# Patient Record
Sex: MALE | Race: WHITE | NOT HISPANIC OR LATINO | Employment: OTHER | URBAN - METROPOLITAN AREA
[De-identification: names, ages, dates, MRNs, and addresses within clinical notes are randomized per-mention and may not be internally consistent; named-entity substitution may affect disease eponyms.]

---

## 2018-06-05 ENCOUNTER — HOSPITAL ENCOUNTER (OUTPATIENT)
Dept: RADIOLOGY | Facility: MEDICAL CENTER | Age: 53
End: 2018-06-05

## 2018-06-11 ENCOUNTER — TELEMEDICINE2 (OUTPATIENT)
Dept: PHYSICAL MEDICINE AND REHAB | Facility: MEDICAL CENTER | Age: 53
End: 2018-06-11
Payer: COMMERCIAL

## 2018-06-11 ENCOUNTER — DOCUMENTATION (OUTPATIENT)
Dept: PHYSICAL MEDICINE AND REHAB | Facility: MEDICAL CENTER | Age: 53
End: 2018-06-11

## 2018-06-11 VITALS
SYSTOLIC BLOOD PRESSURE: 152 MMHG | DIASTOLIC BLOOD PRESSURE: 90 MMHG | WEIGHT: 152 LBS | BODY MASS INDEX: 22.51 KG/M2 | TEMPERATURE: 98.3 F | RESPIRATION RATE: 20 BRPM | OXYGEN SATURATION: 98 % | HEART RATE: 87 BPM | HEIGHT: 69 IN

## 2018-06-11 DIAGNOSIS — F43.10 PTSD (POST-TRAUMATIC STRESS DISORDER): ICD-10-CM

## 2018-06-11 DIAGNOSIS — Z72.0 TOBACCO ABUSE: ICD-10-CM

## 2018-06-11 DIAGNOSIS — M54.16 LUMBAR RADICULOPATHY: ICD-10-CM

## 2018-06-11 DIAGNOSIS — G89.29 CHRONIC BILATERAL LOW BACK PAIN WITH BILATERAL SCIATICA: ICD-10-CM

## 2018-06-11 DIAGNOSIS — M54.42 CHRONIC BILATERAL LOW BACK PAIN WITH BILATERAL SCIATICA: ICD-10-CM

## 2018-06-11 DIAGNOSIS — R29.898 WEAKNESS OF LEFT LOWER EXTREMITY: ICD-10-CM

## 2018-06-11 DIAGNOSIS — M54.41 CHRONIC BILATERAL LOW BACK PAIN WITH BILATERAL SCIATICA: ICD-10-CM

## 2018-06-11 DIAGNOSIS — M21.372 LEFT FOOT DROP: ICD-10-CM

## 2018-06-11 PROCEDURE — 99406 BEHAV CHNG SMOKING 3-10 MIN: CPT | Performed by: PHYSICAL MEDICINE & REHABILITATION

## 2018-06-11 PROCEDURE — 99205 OFFICE O/P NEW HI 60 MIN: CPT | Mod: 25 | Performed by: PHYSICAL MEDICINE & REHABILITATION

## 2018-06-11 RX ORDER — METOPROLOL SUCCINATE 50 MG/1
50 TABLET, EXTENDED RELEASE ORAL DAILY
COMMUNITY

## 2018-06-11 RX ORDER — QUETIAPINE FUMARATE 25 MG/1
25 TABLET, FILM COATED ORAL
COMMUNITY

## 2018-06-11 RX ORDER — HYDROCODONE BITARTRATE AND ACETAMINOPHEN 10; 325 MG/1; MG/1
1-2 TABLET ORAL EVERY 6 HOURS PRN
COMMUNITY

## 2018-06-11 RX ORDER — TRAZODONE HYDROCHLORIDE 100 MG/1
200 TABLET ORAL NIGHTLY
COMMUNITY

## 2018-06-11 RX ORDER — FOLIC ACID 1 MG/1
1 TABLET ORAL DAILY
COMMUNITY

## 2018-06-11 RX ORDER — BACLOFEN 10 MG/1
10 TABLET ORAL 3 TIMES DAILY
COMMUNITY

## 2018-06-11 RX ORDER — PRAZOSIN HYDROCHLORIDE 2 MG/1
2 CAPSULE ORAL NIGHTLY
COMMUNITY

## 2018-06-11 ASSESSMENT — PAIN SCALES - GENERAL: PAINLEVEL: 7=MODERATE-SEVERE PAIN

## 2018-06-11 NOTE — PROGRESS NOTES
Telemedicine New patient note    Physiatry (physical medicine and  Rehabilitation), interventional spine and sports medicine, Pain medicine    Date of Service: 6/11/2018    Chief complaint: low back pain    HISTORY    HPI: Maverick Henderson Jr. 52 y.o. male who presents today with chronic bilateral low back pain radiating down bilateral legs left worse than right with chronic weakness of the left lower extremity with foot drop and decreased strength with heel raises on the left when compared to the right.  The patient is also having significant amounts of falls secondary to weakness in the left lower extremity especially when he is trying to be more active.  Pain is worse with sitting typically, moderate to severe in intensity, aching in quality, shooting in quality, constant.  The patient has significant difficulty with his ADLs including lower body dressing with putting on his socks and shoes.  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  The patient has had a total of 6 lumbar spine surgeries and has had no significant improvement.  He has tried medial branch blocks and radiofrequency neurotomies with no significant improvement.  Pain has been worsening over the past year or so.       Medical records review:  I reviewed the note from the referring provider Jyothi Perez P.A. including the note dated 1/4/2018.  Essential hypertension, monitoring.  Chronic low back pain Norco refilled, referred to physiatry.    Previous treatments:    Physical Therapy: Yes    Medications the patient is tried: NSAIDs, Narcotics, gabapentin, tylenol and muscle relaxers    Previous interventions: Radiofrequency neurotomies were not helpful to the patient in 2011.    Previous surgeries to relieve the above pain: Yes, 6 lumbar spine surgeries including L4-5 fusion.    I reviewed the  which shows no overlapping prescriptions and the patient has had his  narcotic dose decreased from 180 tabs of Norco 10-3 25 per month and slowly titrated down now to 165 times per month.    ROS:   Red Flags ROS:   Fever, Chills, Sweats: Denies  Involuntary Weight Loss: Denies  Bladder Incontinence: Denies  Bowel Incontinence: denies  Saddle Anesthesia: Denies    All other systems reviewed and negative.       PMHx:   Past Medical History:   Diagnosis Date   • Other specified disorder of intestines     history of colitis   • Pain     lower back   • Pneumonia >2 yrs   • Psychiatric problem     anxiety and depression       PSHx:   Past Surgical History:   Procedure Laterality Date   • LUMBAR FUSION POSTERIOR  4/24/2013    Performed by Bj Ruvalcaba M.D. at SURGERY [facility]   • LUMBAR DECOMPRESSION  4/24/2013    Performed by Bj Ruvalcaba M.D. at SURGERY [facility]   • LUMBAR LAMINECTOMY DISKECTOMY  8/1/2011    Performed by BJ RUVALCABA at SURGERY [facility]   • LUMBAR DECOMPRESSION  8/1/2011    Performed by BJ RUVALCABA at SURGERY [facility]   • OTHER      1986  tonsillectomy   • OTHER NEUROLOGICAL SURG      1998 lower back  2001 lower back       Family history   History reviewed. No pertinent family history.      Medications:   Current Outpatient Prescriptions   Medication   • prazosin (MINIPRESS) 2 MG Cap   • traZODone (DESYREL) 100 MG Tab   • folic acid (FOLVITE) 1 MG Tab   • QUEtiapine (SEROQUEL) 25 MG Tab   • HYDROcodone/acetaminophen (NORCO)  MG Tab   • baclofen (LIORESAL) 10 MG Tab   • metoprolol SR (TOPROL XL) 50 MG TABLET SR 24 HR   • olanzapine (ZYPREXA) 2.5 MG TABS   • oxycodone-acetaminophen (PERCOCET) 5-325 MG TABS   • methocarbamol (ROBAXIN) 750 MG TABS   • zolpidem (AMBIEN) 5 MG TABS   • senna-docusate (PERICOLACE OR SENOKOT S) 8.6-50 MG TABS   • oxycodone-acetaminophen (PERCOCET) 5-325 MG TABS   • methocarbamol (ROBAXIN) 750 MG TABS   • zolpidem (AMBIEN) 5 MG TABS   • NON SPECIFIED   • NON SPECIFIED   • chlordiazepoxide (LIBRIUM) 5 MG  "CAPS   • Non Formulary Request     No current facility-administered medications for this visit.        Allergies:   No Known Allergies    Social Hx:   Social History     Social History   • Marital status:      Spouse name: N/A   • Number of children: N/A   • Years of education: N/A     Occupational History   • Not on file.     Social History Main Topics   • Smoking status: Never Smoker   • Smokeless tobacco: Current User     Types: Chew   • Alcohol use Yes      Comment: 24-36 beer per week   • Drug use: No   • Sexual activity: Not on file     Other Topics Concern   •  Service Yes   • Blood Transfusions Yes   • Caffeine Concern No   • Occupational Exposure No   • Hobby Hazards Yes   • Sleep Concern Yes   • Stress Concern Yes   • Weight Concern No   • Special Diet No   • Back Care No   • Exercise Yes   • Bike Helmet No   • Seat Belt Yes   • Self-Exams Yes     Social History Narrative   • No narrative on file         EXAMINATION     Physical Exam:   Vitals: Blood pressure 152/90, pulse 87, temperature 36.8 °C (98.3 °F), resp. rate 20, height 1.74 m (5' 8.5\"), weight 68.9 kg (152 lb), SpO2 98 %.    Constitutional:   Body Habitus: Body mass index is 22.78 kg/m².  Cooperation: Fully cooperates with exam  Appearance: Well-groomed, well-nourished, not disheveled     Eyes: No scleral icterus to suggest severe liver disease, no proptosis to suggest severe hyperthyroid    ENT -no obvious auditory deficits, no obvious tongue lesions, tongue midline, no facial droop     Skin -no rashes or lesions noted     Respiratory-  breathing comfortable on room air, no audible wheezing    Cardiovascular- capillary refills less than 2 seconds. No lower extremity edema is noted.     Gastrointestinal - no obvious abdominal masses, No tenderness to palpation in the abdomen    Psychiatric- alert and oriented ×3. Normal affect.     Gait - normal gait, no use of ambulatory device, nonantalgic.  The patient cannot heel walk and toe " walk on the left because of weakness.    Musculoskeletal -     Thoracic/Lumbar Spine/Sacral Spine/Hips   Inspection: Atrophy of the left lower extremity when compared to the right and both anterior and posterior compartments.  No other evidence of atrophy in bilateral lower extremities throughout however this is limited given his a telemedicine visit and limited by audiovideo quality.    ROM: decreased AROM with flexion, extension, lateral flexion, and rotation bilaterally, with pain     Lumbar spine Special tests  Neuro tension  Straight leg test positive bilaterally    Slump test positive bilaterally      Neuro       Motor Exam Lower Extremities. Exam is severely limited because of this being a telemedicine visit..  3*    ? Myotome R L   Hip flexion L2 3* 3*   Knee extension L3 3* 3*   Ankle dorsiflexion L4 3* 3*   Toe extension L5 3* 3*   Ankle plantarflexion S1 3* 3*       Telephone on the modified Benny scale could not be evaluated because this is a telemedicine visit.    Upper motor neuron signs including Troy sign, Babinski's guidance, clonus in the ankle cannot be evaluated because his a telemedicine visit.    Reflexes could not be evaluated because this is a telemedicine visit.      MEDICAL DECISION MAKING    Medical records review: see under HPI section.     DATA    Labs:   Lab Results   Component Value Date/Time    SODIUM 137 04/25/2013 03:30 AM    POTASSIUM 4.0 04/25/2013 03:30 AM    CHLORIDE 105 04/25/2013 03:30 AM    CO2 26 04/25/2013 03:30 AM    GLUCOSE 92 04/25/2013 03:30 AM    BUN 9 04/25/2013 03:30 AM    CREATININE 0.81 04/25/2013 03:30 AM        No results found for: PROTHROMBTM, INR     Lab Results   Component Value Date/Time    WBC 7.8 04/25/2013 03:30 AM    RBC 3.35 (L) 04/25/2013 03:30 AM    HEMOGLOBIN 12.2 (L) 04/25/2013 03:30 AM    HEMATOCRIT 36.4 (L) 04/25/2013 03:30 AM    .5 (H) 04/25/2013 03:30 AM    MCH 36.5 (H) 04/25/2013 03:30 AM    MCHC 33.6 04/25/2013 03:30 AM    MPV 7.0  04/25/2013 03:30 AM          Imaging: I personally reviewed following images, these are my reads  MRI lumbar spine 5/25/2018  L4-5 fusion.  Small disc herniation L5-S1 seen on the sagittal view series 3 image 7 possibly impinging on the bilateral L5 nerve roots.  Minimal L2-3 spinal stenosis.  Atrophy of the paraspinal muscles consistent with fusion.    IMAGING radiology reads. I reviewed the following radiology reads                                          MRI lumbar spine              Diagnosis   Visit Diagnoses     ICD-10-CM   1. Chronic bilateral low back pain with bilateral sciatica M54.42    M54.41    G89.29   2. Lumbar radiculopathy M54.16   3. Left foot drop M21.372   4. Weakness of left lower extremity R29.898   5. PTSD (post-traumatic stress disorder) F43.10   6. Tobacco abuse, smokeless  Z72.0             ASSESSMENT:  Maverick Israel Jerardoolgalinda Abdi. 52 y.o. male with chronic low back pain radiating on the bilateral legs consistent with chronic lumbar radiculopathy with left foot drop status post 6 spine surgeries and failed medial branch blocks and radiofrequency neurotomies.  The patient does have a new disc herniation is evidence on his imaging at the L5-S1 level which may be responsible for the worsening radiculopathy.  The patient is failed physical therapy, conservative management and medical management.  Of significant concern is a history of multiple falls likely secondary to left foot drop with radiculopathy.  The patient does not have an ankle-foot orthoses.  He has not been evaluated for this.     Maverick was seen today for new patient.    Diagnoses and all orders for this visit:    Chronic bilateral low back pain with bilateral sciatica    Lumbar radiculopathy    Left foot drop    Weakness of left lower extremity    PTSD (post-traumatic stress disorder)    Tobacco abuse, smokeless     I advised quitting smoking and we discussed the health benefits of quitting smoking. I also provided information  for QUIT tobacco program at [facility]. The patient was instructed to call [phone] or to visit our website at [email or web address]. We discussed the patient may be a candidate for counseling through the program. This discussion was 4 minutes of counseling.       PLAN  Physical therapy: I do not believe the patient would tolerate physical therapy at this time given neurotension signs positive and significant decreased sitting tolerance.  I will consider physical therapy after the patient has an ankle-foot orthosis.    Medications: The patient has an ORT of 2.  There are no overlapping prescriptions on the .  I recommend routine drug screening and the patient is a low risk for this can be done approximately yearly.  Norco is reasonable for this patient given the pathology and that is he is a relatively low risk patient given the ORT.  I recommend decreasing the dose of the narcotic slowly by 5 pills per month titrating down to the lowest effective dose however I do anticipate the patient will need some narcotic for pain control.    I recommend a trial of Cymbalta if there are no other contraindications was may be helpful for mood as well as pain.    Consider low-dose gabapentin if this is not been tried with the patient.    Interventional program: We discussed a transforaminal epidural steroid injection at the L5-S1 level bilaterally however I like to see the patient in clinic first to have a baseline strength exam as this is severely limited with telemedicine.    Referrals: I recommend a referral to behavioral therapy for routine follow-up for patient with PTSD and chronic pain.  The patient will likely benefit from a left ankle-foot orthoses for treatment of foot drop however I would like to examine the patient to see if there is any spasticity component of this prior to recommending an ankle-foot orthosis.      Follow-up: I would like the patient to follow-up in my in person clinic in [place] in 2-4  weeks.    Please note there are limitations in the physical exam because this is a telemedicine visit. I did make every effort to substitute other tests through telemedicine    Please note that this dictation was created using voice recognition software. I have made every reasonable attempt to correct obvious errors but there may be errors of grammar and content that I may have overlooked prior to finalization of this note.      Bandar Peterson MD  Physical Medicine and Rehabilitation  Interventional Spine and Sports Physiatry  [facility]               Jyothi Jones, PDexterA.

## 2018-06-11 NOTE — PATIENT INSTRUCTIONS
I advise quitting smoking with multiple health benefits for you have those around you. Please call [phone] or  visit our website at [email or web address] to see if you are a candidate for the QUIT tobacco program at [facility]. .

## 2018-06-28 ENCOUNTER — OFFICE VISIT (OUTPATIENT)
Dept: PHYSICAL MEDICINE AND REHAB | Facility: MEDICAL CENTER | Age: 53
End: 2018-06-28
Payer: COMMERCIAL

## 2018-06-28 VITALS
HEART RATE: 87 BPM | HEIGHT: 69 IN | BODY MASS INDEX: 22.81 KG/M2 | TEMPERATURE: 99.9 F | WEIGHT: 154 LBS | OXYGEN SATURATION: 98 % | SYSTOLIC BLOOD PRESSURE: 160 MMHG | DIASTOLIC BLOOD PRESSURE: 82 MMHG

## 2018-06-28 DIAGNOSIS — R29.898 WEAKNESS OF LEFT LOWER EXTREMITY: ICD-10-CM

## 2018-06-28 DIAGNOSIS — M51.26 LUMBAR DISC HERNIATION: ICD-10-CM

## 2018-06-28 DIAGNOSIS — M54.16 LUMBAR RADICULOPATHY: ICD-10-CM

## 2018-06-28 DIAGNOSIS — M21.372 LEFT FOOT DROP: ICD-10-CM

## 2018-06-28 DIAGNOSIS — R26.9 ABNORMAL GAIT: ICD-10-CM

## 2018-06-28 DIAGNOSIS — Z72.0 TOBACCO ABUSE: ICD-10-CM

## 2018-06-28 PROCEDURE — 99406 BEHAV CHNG SMOKING 3-10 MIN: CPT | Performed by: PHYSICAL MEDICINE & REHABILITATION

## 2018-06-28 PROCEDURE — 99214 OFFICE O/P EST MOD 30 MIN: CPT | Mod: 25 | Performed by: PHYSICAL MEDICINE & REHABILITATION

## 2018-06-28 ASSESSMENT — PAIN SCALES - GENERAL: PAINLEVEL: 7=MODERATE-SEVERE PAIN

## 2018-06-29 NOTE — PROGRESS NOTES
Follow up patient note  Interventional spine and sports physiatry, Physical medicine rehabilitation      Chief complaint:   Chief Complaint   Patient presents with   • Follow-Up     low back pain         HISTORY    Please see new patient note dated 6/11/2018 by Dr Peterson,  for more details.     HPI  Patient identification: Maverick Henderson Jr. 52 y.o. male with a history of multiple spine surgeries with the last one being in 2013 resulting in left foot drop    Interval history:  The patient has had no improvement in his left low back pain radiating down the left leg, moderate severe intensity, worse with sitting, worse with driving, shooting in quality which radiates to the dorsal aspect of the left foot.  Continues to have left foot drop which is been stable for the past 5 years.  The patient has had an EMG of the left lower extremity which she states was positive for an L5 radiculopathy       ROS Red Flags :   Fever, Chills, Sweats: Denies  Involuntary Weight Loss: Denies  Bowel/Bladder Incontinence: Denies  Saddle Anesthesia: Denies        PMHx:   Past Medical History:   Diagnosis Date   • Other specified disorder of intestines     history of colitis   • Pain     lower back   • Pneumonia >2 yrs   • Psychiatric problem     anxiety and depression       PSHx:   Past Surgical History:   Procedure Laterality Date   • LUMBAR FUSION POSTERIOR  4/24/2013    Performed by Bj Ruvalcaba M.D. at SURGERY [facility]   • LUMBAR DECOMPRESSION  4/24/2013    Performed by Bj Ruvalcaba M.D. at SURGERY [facility]   • LUMBAR LAMINECTOMY DISKECTOMY  8/1/2011    Performed by BJ RUVALCABA at [facility]   • LUMBAR DECOMPRESSION  8/1/2011    Performed by BJ RUVALCABA at SURGERY [facility]   • OTHER      1986  tonsillectomy   • OTHER NEUROLOGICAL SURG      1998 lower back  2001 lower back       Family history     History reviewed. No pertinent family history.      Medications:   Current Outpatient  "Prescriptions   Medication   • prazosin (MINIPRESS) 2 MG Cap   • traZODone (DESYREL) 100 MG Tab   • folic acid (FOLVITE) 1 MG Tab   • QUEtiapine (SEROQUEL) 25 MG Tab   • HYDROcodone/acetaminophen (NORCO)  MG Tab   • baclofen (LIORESAL) 10 MG Tab   • metoprolol SR (TOPROL XL) 50 MG TABLET SR 24 HR   • oxycodone-acetaminophen (PERCOCET) 5-325 MG TABS   • oxycodone-acetaminophen (PERCOCET) 5-325 MG TABS   • methocarbamol (ROBAXIN) 750 MG TABS   • zolpidem (AMBIEN) 5 MG TABS   • senna-docusate (PERICOLACE OR SENOKOT S) 8.6-50 MG TABS   • methocarbamol (ROBAXIN) 750 MG TABS   • zolpidem (AMBIEN) 5 MG TABS   • NON SPECIFIED   • NON SPECIFIED   • olanzapine (ZYPREXA) 2.5 MG TABS   • chlordiazepoxide (LIBRIUM) 5 MG CAPS   • Non Formulary Request     No current facility-administered medications for this visit.        Allergies:   No Known Allergies    Social Hx:   Social History     Social History   • Marital status:      Spouse name: N/A   • Number of children: N/A   • Years of education: N/A     Occupational History   • Not on file.     Social History Main Topics   • Smoking status: Never Smoker   • Smokeless tobacco: Current User     Types: Chew   • Alcohol use Yes      Comment: 24-36 beer per week   • Drug use: No   • Sexual activity: Not on file     Other Topics Concern   •  Service Yes   • Blood Transfusions Yes   • Caffeine Concern No   • Occupational Exposure No   • Hobby Hazards Yes   • Sleep Concern Yes   • Stress Concern Yes   • Weight Concern No   • Special Diet No   • Back Care No   • Exercise Yes   • Bike Helmet No   • Seat Belt Yes   • Self-Exams Yes     Social History Narrative   • No narrative on file         EXAMINATION     Physical Exam:   Vitals: Blood pressure 160/82, pulse 87, temperature 37.7 °C (99.9 °F), height 1.74 m (5' 8.5\"), weight 69.9 kg (154 lb), SpO2 98 %.    Constitutional:   Body Habitus: Body mass index is 23.08 kg/m².  Cooperation: Fully cooperates with " exam  Appearance: Well-groomed no disheveled     Respiratory-  breathing comfortable on room air, no audible wheezing  Cardiovascular- capillary refills less than 2 seconds. No lower extremity edema is noted.   Psychiatric- alert and oriented ×3. Normal affect.     Gait: Hip hike on the left With mild left foot drop    spine: Slump test and straight leg raise test are positive on the left and negative on the right.    Motor Exam Lower Extremities    ? Myotome R L   Hip flexion L2 5 5   Knee extension L3 5 5   Ankle dorsiflexion L4 5 4   Toe extension L5 5 4   Ankle plantarflexion S1 5 5         Key points for the international standards for neurological classification of spinal cord injury (ISNCSCI) to light touch.     Dermatome R L                                      L2 2 2   L3 2 2   L4 2 1   L5 2 1   S1 2 2   S2 2 2                 MEDICAL DECISION MAKING    DATA    Labs:   Lab Results   Component Value Date/Time    SODIUM 137 04/25/2013 03:30 AM    POTASSIUM 4.0 04/25/2013 03:30 AM    CHLORIDE 105 04/25/2013 03:30 AM    CO2 26 04/25/2013 03:30 AM    GLUCOSE 92 04/25/2013 03:30 AM    BUN 9 04/25/2013 03:30 AM    CREATININE 0.81 04/25/2013 03:30 AM        No results found for: PROTHROMBTM, INR     Lab Results   Component Value Date/Time    WBC 7.8 04/25/2013 03:30 AM    RBC 3.35 (L) 04/25/2013 03:30 AM    HEMOGLOBIN 12.2 (L) 04/25/2013 03:30 AM    HEMATOCRIT 36.4 (L) 04/25/2013 03:30 AM    .5 (H) 04/25/2013 03:30 AM    MCH 36.5 (H) 04/25/2013 03:30 AM    MCHC 33.6 04/25/2013 03:30 AM    MPV 7.0 04/25/2013 03:30 AM        No results found for: HBA1C       Imaging: I personally reviewed following images  MRI lumbar spine 5/25/2018  L4-5 fusion.  Small disc herniation L5-S1 seen on the sagittal view series 3 image 7 possibly impinging on the bilateral L5 nerve roots.  Minimal L2-3 spinal stenosis.  Atrophy of the paraspinal muscles consistent with fusion.     IMAGING radiology reads. I reviewed the following  radiology reads                                          MRI lumbar spine             DIAGNOSIS   Visit Diagnoses     ICD-10-CM   1. Lumbar radiculopathy left L5 M54.16   2. Lumbar disc herniation L5-S1 M51.26   3. Left foot drop M21.372   4. Weakness of left lower extremity R29.898   5. Tobacco abuse, smokeless  Z72.0         ASSESSMENT and PLAN:     Maverick Henderson Jr. 52 y.o. male who is failed multiple surgeries  with the last surgery in 2013, the patient states that had foot drop following surgery.  There is weakness in left L4 and L5 on exam today with an abnormal wear pattern on the patient's shoe consistent with left foot drop, hip hike is seen on the left with mild left foot drop.  The patient states that he has been tripping and falling when he is at home.  I have ordered a referral to orthotics for a left ankle-foot orthoses.  I have also ordered a transforaminal epidural steroid injection of the left L5-S1 level for treatment of radiculopathy.     Maverick was seen today for follow-up.    Diagnoses and all orders for this visit:    Lumbar radiculopathy left L5  -     REFERRAL TO EMG - PHYSIATRY (PMR)  -     REFERRAL TO PHYSICIAL MEDICINE REHAB    Lumbar disc herniation L5-S1  -     REFERRAL TO EMG - PHYSIATRY (PMR)    Left foot drop  -     REFERRAL TO EMG - PHYSIATRY (PMR)  -     REFERRAL FOR ORTHOTICS    Weakness of left lower extremity    Abnormal gait    Tobacco abuse, smokeless     I advised quitting smoking and we discussed the health benefits of quitting smoking. I also provided information for QUIT tobacco program at [facility]. The patient was instructed to call [phone] or to visit our website at [email or web address]. We discussed the patient may be a candidate for counseling through the program. This discussion was 5 minutes of counseling.        Follow up: 2 weeks after the procedure    Thank you for allowing me to participate in the care of this patient. If you have any questions  please not hesitate to contact me.        Please note that this dictation was created using voice recognition software. I have made every reasonable attempt to correct obvious errors but there may be errors of grammar and content that I may have overlooked prior to finalization of this note.      Bandar Peterson MD  Interventional Spine and Sports Physiatry  Physical Medicine and Rehabilitation  [facility]  6/28/2018 5:07 PM

## 2018-07-27 ENCOUNTER — APPOINTMENT (OUTPATIENT)
Dept: PHYSICAL MEDICINE AND REHAB | Facility: MEDICAL CENTER | Age: 53
End: 2018-07-27
Payer: COMMERCIAL

## 2018-08-13 ENCOUNTER — HOSPITAL ENCOUNTER (OUTPATIENT)
Dept: PAIN MANAGEMENT | Facility: REHABILITATION | Age: 53
End: 2018-08-13
Attending: PHYSICAL MEDICINE & REHABILITATION
Payer: COMMERCIAL

## 2018-08-20 ENCOUNTER — HOSPITAL ENCOUNTER (OUTPATIENT)
Dept: PAIN MANAGEMENT | Facility: REHABILITATION | Age: 53
End: 2018-08-20
Attending: PHYSICAL MEDICINE & REHABILITATION
Payer: COMMERCIAL

## 2018-08-20 ENCOUNTER — HOSPITAL ENCOUNTER (OUTPATIENT)
Dept: RADIOLOGY | Facility: REHABILITATION | Age: 53
End: 2018-08-20
Attending: PHYSICAL MEDICINE & REHABILITATION
Payer: COMMERCIAL

## 2018-08-20 VITALS
OXYGEN SATURATION: 95 % | WEIGHT: 157.41 LBS | HEART RATE: 66 BPM | TEMPERATURE: 97.8 F | RESPIRATION RATE: 6 BRPM | DIASTOLIC BLOOD PRESSURE: 103 MMHG | HEIGHT: 68 IN | SYSTOLIC BLOOD PRESSURE: 164 MMHG | BODY MASS INDEX: 23.86 KG/M2

## 2018-08-20 PROCEDURE — 700111 HCHG RX REV CODE 636 W/ 250 OVERRIDE (IP)

## 2018-08-20 PROCEDURE — 64483 NJX AA&/STRD TFRM EPI L/S 1: CPT

## 2018-08-20 PROCEDURE — 700117 HCHG RX CONTRAST REV CODE 255

## 2018-08-20 RX ORDER — LIDOCAINE HYDROCHLORIDE 10 MG/ML
INJECTION, SOLUTION EPIDURAL; INFILTRATION; INTRACAUDAL; PERINEURAL
Status: COMPLETED
Start: 2018-08-20 | End: 2018-08-20

## 2018-08-20 RX ORDER — CYCLOBENZAPRINE HCL 10 MG
10 TABLET ORAL 3 TIMES DAILY PRN
COMMUNITY

## 2018-08-20 RX ORDER — DEXAMETHASONE SODIUM PHOSPHATE 10 MG/ML
INJECTION, SOLUTION INTRAMUSCULAR; INTRAVENOUS
Status: COMPLETED
Start: 2018-08-20 | End: 2018-08-20

## 2018-08-20 RX ADMIN — DEXAMETHASONE SODIUM PHOSPHATE 10 MG: 10 INJECTION, SOLUTION INTRAMUSCULAR; INTRAVENOUS at 11:53

## 2018-08-20 RX ADMIN — IOHEXOL 1 ML: 240 INJECTION, SOLUTION INTRATHECAL; INTRAVASCULAR; INTRAVENOUS; ORAL at 11:52

## 2018-08-20 RX ADMIN — LIDOCAINE HYDROCHLORIDE 10 ML: 10 INJECTION, SOLUTION EPIDURAL; INFILTRATION; INTRACAUDAL; PERINEURAL at 11:50

## 2018-08-20 ASSESSMENT — PAIN SCALES - GENERAL
PAINLEVEL_OUTOF10: 4
PAINLEVEL_OUTOF10: 8

## 2018-08-20 NOTE — PROCEDURES
Date of Service: 8/20/2018     Patient: Maverick Henderson Jr. 52 y.o. male     MRN: [id number]     Physician/s: Bandar Peterson MD    Pre-operative Diagnosis: Lumbar radiculopathy    Post-operative Diagnosis: Lumbar radiculopathy    Procedure: left Lumbar Transforaminal Epidural Steroid  at the L5-S1 levels.     Description of procedure:    The risks, benefits, and alternatives of the procedure were reviewed and discussed with the patient.  Written informed consent was freely obtained. A pre-procedural time-out was conducted by the physician verifying patient’s identity, procedure to be performed, procedure site and side, and allergy verification. Appropriate equipment was determined to be in place for the procedure.       The patient's vital signs were carefully monitored before, throughout, and after the procedure.     In the fluoroscopy suite the patient was placed in a prone position, a pillow placed underneath their umbilicus. The skin was prepped and draped in the usual sterile fashion. The fluoroscope was placed over the lumbar spine and adjusted into the proper AP/Oblique view to enter the transforaminal space at the levels below. The targets for injection were then marked at the left L5-S1. A 27g 1.5 inch needle was placed into the marked site, and approx 2cc of 1% Lidocaine was injected subcutaneously into the epidermal and dermal layers. The needle was removed. } A 25g 3.5 inch spinal needle was then placed and advanced under fluoroscopic guidance in an oblique view towards the subpedicular epidural space of the levels noted below. The needle position was confirmed to not be past the 6 o'clock position in the AP view and it was in the neural foramen and the lateral view. Under live fluoroscopic guidance in the AP view, contrast dye was used to highlight the epidural space spread.  Following negative aspiration, approx 1mL of 1% lidocaine preservative free with 10 mg of dexamethasone was then injected at  each level, and the needles were removed intact after restyleted. The patient's back was covered with a 4x4 gauze, the area was cleansed with sterile normal saline, and a dressing was applied. There were no complications noted.     The patient was then evaluated post-procedure, and was hemodynamically stable prior to leaving the post-operative care unit.     A follow-up visit in clinic as scheduled for 8/30/2018     Bandar Peterson MD  Physical Medicine and Rehabilitation  Interventional Spine and Sports Physiatry  [facility]          CPT codes  Transforaminal epidural injection- lumbar or sacral (first level):  33277

## 2018-08-20 NOTE — PROGRESS NOTES
Med reconciliation completed. Pt has . Patient denies taking any blood thinners, antibiotics. Last dose Ibuprofen 1 week ago.  Discharge instructions reviewed & copy given to patient.

## 2018-08-20 NOTE — PROGRESS NOTES
Timeout : medication allergies, pertinent medical history, significant patient information, procedure & site marked by Dr. Peterson  . Positioned patient by CST,RN, X - ray Tech. Both lower legs & feet pillow placed for support. Hands supported on stool under head of the bed. Procedure tolerated well by patient. Accompanied to recovery room, ambulatory.

## 2018-08-20 NOTE — H&P
Physiatry (physical medicine and  Rehabilitation), interventional spine and sports medicine, Pain medicine     Date of Service: 6/11/2018     Chief complaint: low back pain     HISTORY     HPI: Maverick Henderson Jr. 52 y.o. male who presents today with chronic bilateral low back pain radiating down bilateral legs left worse than right with chronic weakness of the left lower extremity with foot drop and decreased strength with heel raises on the left when compared to the right.  The patient is also having significant amounts of falls secondary to weakness in the left lower extremity especially when he is trying to be more active.  Pain is worse with sitting typically, moderate to severe in intensity, aching in quality, shooting in quality, constant.  The patient has significant difficulty with his ADLs including lower body dressing with putting on his socks and shoes.  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  The patient has had a total of 6 lumbar spine surgeries and has had no significant improvement.  He has tried medial branch blocks and radiofrequency neurotomies with no significant improvement.  Pain has been worsening over the past year or so.        Medical records review:  I reviewed the note from the referring provider Jyothi Perez P.A. including the note dated 1/4/2018.  Essential hypertension, monitoring.  Chronic low back pain Norco refilled, referred to physiatry.     Previous treatments:     Physical Therapy: Yes     Medications the patient is tried: NSAIDs, Narcotics, gabapentin, tylenol and muscle relaxers     Previous interventions: Radiofrequency neurotomies were not helpful to the patient in 2011.     Previous surgeries to relieve the above pain: Yes, 6 lumbar spine surgeries including L4-5 fusion.     I reviewed the  which shows no overlapping prescriptions and the patient has had his narcotic dose decreased  from 180 tabs of Norco 10-3 25 per month and slowly titrated down now to 165 times per month.     ROS:   Red Flags ROS:   Fever, Chills, Sweats: Denies  Involuntary Weight Loss: Denies  Bladder Incontinence: Denies  Bowel Incontinence: denies  Saddle Anesthesia: Denies     All other systems reviewed and negative.        PMHx:   Past Medical History        Past Medical History:   Diagnosis Date   • Other specified disorder of intestines       history of colitis   • Pain       lower back   • Pneumonia >2 yrs   • Psychiatric problem       anxiety and depression            PSHx:   Past Surgical History   Past Surgical History:   Procedure Laterality Date   • LUMBAR FUSION POSTERIOR   4/24/2013     Performed by Bj Ruvalcaba M.D. at SURGERY [facility]S   • LUMBAR DECOMPRESSION   4/24/2013     Performed by Bj Ruvalcaba M.D. at SUR[facility]ERY [facility]   • LUMBAR LAMINECTOMY DISKECTOMY   8/1/2011     Performed by BJ RUVALCABA at SURGERY [facility]   • LUMBAR DECOMPRESSION   8/1/2011     Performed by BJ RUVALCABA at SURGERY [facility]   • OTHER         1986  tonsillectomy   • OTHER NEUROLOGICAL SURG         1998 lower back  2001 lower back            Family history   Family History   History reviewed. No pertinent family history.           Medications:       Current Outpatient Prescriptions   Medication   • prazosin (MINIPRESS) 2 MG Cap   • traZODone (DESYREL) 100 MG Tab   • folic acid (FOLVITE) 1 MG Tab   • QUEtiapine (SEROQUEL) 25 MG Tab   • HYDROcodone/acetaminophen (NORCO)  MG Tab   • baclofen (LIORESAL) 10 MG Tab   • metoprolol SR (TOPROL XL) 50 MG TABLET SR 24 HR   • olanzapine (ZYPREXA) 2.5 MG TABS   • oxycodone-acetaminophen (PERCOCET) 5-325 MG TABS   • methocarbamol (ROBAXIN) 750 MG TABS   • zolpidem (AMBIEN) 5 MG TABS   • senna-docusate (PERICOLACE OR SENOKOT S) 8.6-50 MG TABS   • oxycodone-acetaminophen (PERCOCET) 5-325 MG TABS   • methocarbamol (ROBAXIN) 750 MG TABS   • zolpidem  "(AMBIEN) 5 MG TABS   • NON SPECIFIED   • NON SPECIFIED   • chlordiazepoxide (LIBRIUM) 5 MG CAPS   • Non Formulary Request      No current facility-administered medications for this visit.          Allergies:   No Known Allergies     Social Hx:   Social History   Social History            Social History   • Marital status:        Spouse name: N/A   • Number of children: N/A   • Years of education: N/A          Occupational History   • Not on file.             Social History Main Topics   • Smoking status: Never Smoker   • Smokeless tobacco: Current User       Types: Chew   • Alcohol use Yes         Comment: 24-36 beer per week   • Drug use: No   • Sexual activity: Not on file           Other Topics Concern   •  Service Yes   • Blood Transfusions Yes   • Caffeine Concern No   • Occupational Exposure No   • Hobby Hazards Yes   • Sleep Concern Yes   • Stress Concern Yes   • Weight Concern No   • Special Diet No   • Back Care No   • Exercise Yes   • Bike Helmet No   • Seat Belt Yes   • Self-Exams Yes          Social History Narrative   • No narrative on file               EXAMINATION      Physical Exam:   Vitals: Blood pressure 152/90, pulse 87, temperature 36.8 °C (98.3 °F), resp. rate 20, height 1.74 m (5' 8.5\"), weight 68.9 kg (152 lb), SpO2 98 %.     Constitutional:   Body Habitus: Body mass index is 22.78 kg/m².  Cooperation: Fully cooperates with exam  Appearance: Well-groomed, well-nourished, not disheveled      Eyes: No scleral icterus to suggest severe liver disease, no proptosis to suggest severe hyperthyroid     ENT -no obvious auditory deficits, no obvious tongue lesions, tongue midline, no facial droop      Skin -no rashes or lesions noted      Respiratory-  breathing comfortable on room air, no audible wheezing     Cardiovascular- capillary refills less than 2 seconds. No lower extremity edema is noted.      Gastrointestinal - no obvious abdominal masses, No tenderness to palpation in the " abdomen     Psychiatric- alert and oriented ×3. Normal affect.      Gait - normal gait, no use of ambulatory device, nonantalgic.  The patient cannot heel walk and toe walk on the left because of weakness.     Musculoskeletal -      Thoracic/Lumbar Spine/Sacral Spine/Hips   Inspection: Atrophy of the left lower extremity when compared to the right and both anterior and posterior compartments.  No other evidence of atrophy in bilateral lower extremities throughout however this is limited given his a telemedicine visit and limited by audiovideo quality.     ROM: decreased AROM with flexion, extension, lateral flexion, and rotation bilaterally, with pain      Lumbar spine Special tests  Neuro tension  Straight leg test positive bilaterally    Slump test positive bilaterally       Neuro         Motor Exam Lower Extremities. Exam is severely limited because of this being a telemedicine visit..  3*     ? Myotome R L   Hip flexion L2 3* 3*   Knee extension L3 3* 3*   Ankle dorsiflexion L4 3* 3*   Toe extension L5 3* 3*   Ankle plantarflexion S1 3* 3*         Telephone on the modified Benny scale could not be evaluated because this is a telemedicine visit.     Upper motor neuron signs including Troy sign, Babinski's guidance, clonus in the ankle cannot be evaluated because his a telemedicine visit.     Reflexes could not be evaluated because this is a telemedicine visit.        MEDICAL DECISION MAKING     Medical records review: see under HPI section.      DATA     Labs:         Lab Results   Component Value Date/Time     SODIUM 137 04/25/2013 03:30 AM     POTASSIUM 4.0 04/25/2013 03:30 AM     CHLORIDE 105 04/25/2013 03:30 AM     CO2 26 04/25/2013 03:30 AM     GLUCOSE 92 04/25/2013 03:30 AM     BUN 9 04/25/2013 03:30 AM     CREATININE 0.81 04/25/2013 03:30 AM         No results found for: PROTHROMBTM, INR            Lab Results   Component Value Date/Time     WBC 7.8 04/25/2013 03:30 AM     RBC 3.35 (L) 04/25/2013  03:30 AM     HEMOGLOBIN 12.2 (L) 04/25/2013 03:30 AM     HEMATOCRIT 36.4 (L) 04/25/2013 03:30 AM     .5 (H) 04/25/2013 03:30 AM     MCH 36.5 (H) 04/25/2013 03:30 AM     MCHC 33.6 04/25/2013 03:30 AM     MPV 7.0 04/25/2013 03:30 AM            Imaging: I personally reviewed following images, these are my reads  MRI lumbar spine 5/25/2018  L4-5 fusion.  Small disc herniation L5-S1 seen on the sagittal view series 3 image 7 possibly impinging on the bilateral L5 nerve roots.  Minimal L2-3 spinal stenosis.  Atrophy of the paraspinal muscles consistent with fusion.     IMAGING radiology reads. I reviewed the following radiology reads                                          MRI lumbar spine                 Diagnosis        Visit Diagnoses       ICD-10-CM   1. Chronic bilateral low back pain with bilateral sciatica M54.42     M54.41     G89.29   2. Lumbar radiculopathy M54.16   3. Left foot drop M21.372   4. Weakness of left lower extremity R29.898   5. PTSD (post-traumatic stress disorder) F43.10   6. Tobacco abuse, smokeless  Z72.0                  ASSESSMENT:  Maverick Israel Santiago Abdi. 52 y.o. male with chronic low back pain radiating on the bilateral legs consistent with chronic lumbar radiculopathy with left foot drop status post 6 spine surgeries and failed medial branch blocks and radiofrequency neurotomies.  The patient does have a new disc herniation is evidence on his imaging at the L5-S1 level which may be responsible for the worsening radiculopathy.  The patient is failed physical therapy, conservative management and medical management.  Of significant concern is a history of multiple falls likely secondary to left foot drop with radiculopathy.  The patient does not have an ankle-foot orthoses.  He has not been evaluated for this.     Maverick was seen today for new patient.     Diagnoses and all orders for this visit:     Chronic bilateral low back pain with bilateral sciatica     Lumbar  radiculopathy     Left foot drop     Weakness of left lower extremity     PTSD (post-traumatic stress disorder)     Tobacco abuse, smokeless      I advised quitting smoking and we discussed the health benefits of quitting smoking. I also provided information for QUIT tobacco program at [employer]. The patient was instructed to call [phone] or to visit our website at [email or web address]. We discussed the patient may be a candidate for counseling through the program. This discussion was 4 minutes of counseling.         PLAN  Physical therapy: I do not believe the patient would tolerate physical therapy at this time given neurotension signs positive and significant decreased sitting tolerance.  I will consider physical therapy after the patient has an ankle-foot orthosis.     Medications: The patient has an ORT of 2.  There are no overlapping prescriptions on the .  I recommend routine drug screening and the patient is a low risk for this can be done approximately yearly.  Norco is reasonable for this patient given the pathology and that is he is a relatively low risk patient given the ORT.  I recommend decreasing the dose of the narcotic slowly by 5 pills per month titrating down to the lowest effective dose however I do anticipate the patient will need some narcotic for pain control.     I recommend a trial of Cymbalta if there are no other contraindications was may be helpful for mood as well as pain.     Consider low-dose gabapentin if this is not been tried with the patient.     Interventional program: We discussed a transforaminal epidural steroid injection at the L5-S1 level bilaterally however I like to see the patient in clinic first to have a baseline strength exam as this is severely limited with telemedicine.     Referrals: I recommend a referral to behavioral therapy for routine follow-up for patient with PTSD and chronic pain.  The patient will likely benefit from a left ankle-foot orthoses for  "treatment of foot drop however I would like to examine the patient to see if there is any spasticity component of this prior to recommending an ankle-foot orthosis.        Follow-up: I would like the patient to follow-up in my in person clinic in [place] in 2-4 weeks.     Please note there are limitations in the physical exam because this is a telemedicine visit. I did make every effort to substitute other tests through telemedicine     Please note that this dictation was created using voice recognition software. I have made every reasonable attempt to correct obvious errors but there may be errors of grammar and content that I may have overlooked prior to finalization of this note.        Bandar Peterson MD  Physical Medicine and Rehabilitation  Interventional Spine and Sports Physiatry  [facility]         ADDENDUM     No significant changes. Patient is not on anticoagulation, antibiotics, antiplatelet drugs. Still with left low back pain radiating to the left buttocks.     Vitals:    08/20/18 1100   BP: (!) 163/99   Pulse: 73   Resp: 16   Temp: 36.6 °C (97.8 °F)   SpO2: 100%   Weight: 71.4 kg (157 lb 6.5 oz)   Height: 1.727 m (5' 8\")      Gen: NAD  Resp: Clear to auscultation bilaterally, no wheezing. Breathing comfortable on room air.  Cardiovascular: Regular rate and rhythm, no murmurs rubs or gallops.       Okay to proceed with left L5-S1 transforaminal epidural steroid injection with fluoroscopic guidance     Bandar Peterson MD  Physical Medicine and Rehabilitation  Interventional Spine and Sports Physiatry  [facility]  8/20/2018  11:33 AM      "

## 2018-08-22 ENCOUNTER — TELEPHONE (OUTPATIENT)
Dept: PHYSICAL MEDICINE AND REHAB | Facility: MEDICAL CENTER | Age: 53
End: 2018-08-22

## 2018-08-30 ENCOUNTER — APPOINTMENT (OUTPATIENT)
Dept: PHYSICAL MEDICINE AND REHAB | Facility: MEDICAL CENTER | Age: 53
End: 2018-08-30
Payer: COMMERCIAL